# Patient Record
Sex: MALE | Race: WHITE
[De-identification: names, ages, dates, MRNs, and addresses within clinical notes are randomized per-mention and may not be internally consistent; named-entity substitution may affect disease eponyms.]

---

## 2021-03-18 ENCOUNTER — HOSPITAL ENCOUNTER (EMERGENCY)
Dept: HOSPITAL 75 - ER | Age: 26
Discharge: HOME | End: 2021-03-18
Payer: COMMERCIAL

## 2021-03-18 VITALS — DIASTOLIC BLOOD PRESSURE: 92 MMHG | SYSTOLIC BLOOD PRESSURE: 136 MMHG

## 2021-03-18 VITALS — WEIGHT: 198.42 LBS | BODY MASS INDEX: 29.39 KG/M2 | HEIGHT: 68.9 IN

## 2021-03-18 DIAGNOSIS — I10: ICD-10-CM

## 2021-03-18 DIAGNOSIS — Z88.2: ICD-10-CM

## 2021-03-18 DIAGNOSIS — T88.1XXA: Primary | ICD-10-CM

## 2021-03-18 DIAGNOSIS — Z88.7: ICD-10-CM

## 2021-03-18 DIAGNOSIS — Z88.1: ICD-10-CM

## 2021-03-18 PROCEDURE — 99283 EMERGENCY DEPT VISIT LOW MDM: CPT

## 2021-03-18 NOTE — ED UPPER EXTREMITY
General


Chief Complaint:  Upper Extremity


Stated Complaint:  L ARM SWOLLEN/NUMB - POST VACCINATION


Nursing Triage Note:  


patient states he recieved COVID vaccine, noticed redness and swelling at site


Nursing Sepsis Screen:  No Definite Risk


Source:  patient


Exam Limitations:  no limitations





History of Present Illness


Date Seen by Provider:  Mar 18, 2021


Time Seen by Provider:  18:10


Initial Comments


This 26-year-old young man presents to the ER on day 4 after the maternal COVID-

19 vaccination.  He has had progressive irritation of the left upper arm, 

particularly over the last 24 hours.  He has marked swelling and erythema 

extending from the mid deltoid area down to the elbow.  It is nearly 

circumferential.  He describes a burning sensation with distal numbness down to 

the fingers.  He denies any fever.  He denies ever having this type of reaction 

to a vaccination in the past.  He has had some slight improvement over the past 

few hours.  He took Benadryl and Tylenol which seemed to help.





Allergies and Home Medications


Allergies


Coded Allergies:  


     sulfamethoxazole (Verified  Adverse Reaction, Intermediate, 

Gastrointestinal upset, 3/18/21)


     trimethoprim (Verified  Adverse Reaction, Intermediate, Gastrointestinal 

upset, 3/18/21)


Uncoded Allergies:  


     COVID-19 vaccine, [place] (Adverse Reaction, Intermediate, Severe swelling,

erythema, numbness of left arm, 3/18/21)





Home Medications


Cephalexin 500 Mg Tablet, 500 MG PO QID


   Prescribed by: YONATAN CLINE on 3/18/21 1830





Patient Home Medication List


Home Medication List Reviewed:  Yes





Review of Systems


Constitutional:  no symptoms reported


EENTM:  no symptoms reported


Respiratory:  no symptoms reported


Cardiovascular:  no symptoms reported


Gastrointestinal:  no symptoms reported


Genitourinary:  no symptoms reported


Musculoskeletal:  no symptoms reported


Skin:  see HPI


Psychiatric/Neurological:  See HPI





Past Medical-Social-Family Hx


Past Med/Social Hx:  Reviewed and Corrections made


Patient Social History


Alcohol Use:  Denies Use


Smoking Status:  Never a Smoker


Recent Infectious Disease Expo:  No





Past Medical History


Surgeries:  No


Respiratory:  No


Cardiac:  No


Neurological:  No


Genitourinary:  No


Gastrointestinal:  Yes (History of GI bleeding)


Musculoskeletal:  No


Endocrine:  No


HEENT:  No


Cancer:  No


Psychosocial:  No





Family Medical History


Reviewed and Corrections made


Renal Disease





Physical Exam


Vital Signs





Vital Signs - First Documented








 3/18/21





 18:12


 


Temp 35.9


 


Pulse 91


 


Resp 20


 


B/P (MAP) 161/119 (133)


 


Pulse Ox 97


 


O2 Delivery Room Air





Capillary Refill : Less Than 3 Seconds


Height, Weight, BMI


Height: '"


Weight: lbs. oz. kg; 29.00 BMI


Method:


General Appearance:  WD/WN, no apparent distress


HEENT:  normal ENT inspection


Cardiovascular:  regular rate, rhythm, no edema, no murmur, other (Normal left 

radial pulse)


Respiratory:  lungs clear, normal breath sounds, no respiratory distress


Elbow/Forearm:  normal ROM, Left (Erythema, swelling and heat extending from the

mid deltoid region down to the left elbow, nearly circumferential.), swelling


Wrist:  Yes normal inspection, Yes normal ROM


Hand:  normal inspection, normal ROM


Neurologic/Tendon:  normal sensation, normal motor functions


Neurologic/Psychiatric:  CNs II-XII nml as tested, alert, normal mood/affect, 

oriented x 3, other (Incomplete numbness of the left upper extremity distally)


Skin:  warm/dry, other (See above)





Progress/Results/Core Measures


Results/Orders


My Orders





Orders - YONATAN GODOY MD


Cephalexin Capsule (Keflex Capsule) (3/18/21 18:30)





Vital Signs/I&O











 3/18/21





 18:12


 


Temp 35.9


 


Pulse 91


 


Resp 20


 


B/P (MAP) 161/119 (133)


 


Pulse Ox 97


 


O2 Delivery Room Air














Blood Pressure Mean:                    133











Progress


Progress Note :  


Progress Note


Although infection is unlikely, he is being prescribed Keflex as a precaution.  

The first dose was administered in the ER.  See discharge instructions for 

further discussion.  Patient also mentioned in passing regarding his medication 

allergies that he has a history of rectal bleeding.  I advised him to discuss 

this with his primary care provider and discuss the possibility of colonoscopy.





Departure


Impression





   Primary Impression:  


   Vaccination reaction


   Qualified Codes:  T50.Z95A - Adverse effect of other vaccines and biological 

   substances, initial encounter


Disposition:  01 HOME, SELF-CARE


Condition:  Improved





Departure-Patient Inst.


Patient Instructions:  COVID-19 Vaccine (mRNA) Moderna FDA Fact Sheet





Add. Discharge Instructions:  


Elevate your arm to the level of your heart as much as possible to reduce pain 

and swelling.


You may additionally apply ice in 20-minute intervals to reduce pain and 

swelling.


Antihistamines such as Benadryl (diphenhydramine) or nondrowsy antihistamine 

such as Claritin (loratadine) may be used to control itching.


You may use Tylenol and/or ibuprofen for pain.


Call with questions or concerns.


Return to care if you have worsening of condition including significant spreadin

g of redness, worsening of numbness, fever, etc.


Although it is unlikely you have an infection, complete the antibiotics as pres

cribed as a precaution.





All discharge instructions reviewed with patient and/or family. Voiced 

understanding.


Scripts


Cephalexin (Cephalexin) 500 Mg Tablet


500 MG PO QID, #28 TAB


   Prov: YONATAN GODOY MD         3/18/21











YONATAN GODOY MD        Mar 18, 2021 18:23